# Patient Record
Sex: FEMALE | Race: WHITE | NOT HISPANIC OR LATINO | Employment: OTHER | URBAN - METROPOLITAN AREA
[De-identification: names, ages, dates, MRNs, and addresses within clinical notes are randomized per-mention and may not be internally consistent; named-entity substitution may affect disease eponyms.]

---

## 2020-08-18 ENCOUNTER — OFFICE VISIT (OUTPATIENT)
Dept: PODIATRY | Facility: CLINIC | Age: 80
End: 2020-08-18
Payer: MEDICARE

## 2020-08-18 VITALS
DIASTOLIC BLOOD PRESSURE: 85 MMHG | OXYGEN SATURATION: 98 % | HEART RATE: 65 BPM | BODY MASS INDEX: 20.09 KG/M2 | SYSTOLIC BLOOD PRESSURE: 144 MMHG | WEIGHT: 125 LBS | TEMPERATURE: 98 F | HEIGHT: 66 IN | RESPIRATION RATE: 19 BRPM

## 2020-08-18 DIAGNOSIS — L84 FOOT CALLUS: ICD-10-CM

## 2020-08-18 DIAGNOSIS — M21.961 ACQUIRED DEFORMITY JOINT FOOT, RIGHT: ICD-10-CM

## 2020-08-18 DIAGNOSIS — L60.1 ONYCHOLYSIS OF TOENAIL: Primary | ICD-10-CM

## 2020-08-18 PROCEDURE — 99204 OFFICE O/P NEW MOD 45 MIN: CPT | Mod: PBBFAC | Performed by: PODIATRIST

## 2020-08-18 PROCEDURE — 99202 PR OFFICE/OUTPT VISIT, NEW, LEVL II, 15-29 MIN: ICD-10-PCS | Mod: S$PBB,,, | Performed by: PODIATRIST

## 2020-08-18 PROCEDURE — 99999 PR PBB SHADOW E&M-NEW PATIENT-LVL IV: CPT | Mod: PBBFAC,,, | Performed by: PODIATRIST

## 2020-08-18 PROCEDURE — 99202 OFFICE O/P NEW SF 15 MIN: CPT | Mod: S$PBB,,, | Performed by: PODIATRIST

## 2020-08-18 PROCEDURE — 99999 PR PBB SHADOW E&M-NEW PATIENT-LVL IV: ICD-10-PCS | Mod: PBBFAC,,, | Performed by: PODIATRIST

## 2020-08-18 RX ORDER — SENNOSIDES 8.6 MG/1
2 TABLET ORAL DAILY
COMMUNITY

## 2020-08-18 RX ORDER — MOMETASONE FUROATE 50 UG/1
SPRAY, METERED NASAL
COMMUNITY
End: 2020-08-18 | Stop reason: SDUPTHER

## 2020-08-18 RX ORDER — PANTOPRAZOLE SODIUM 40 MG/1
TABLET, DELAYED RELEASE ORAL
COMMUNITY
End: 2020-08-18 | Stop reason: SDUPTHER

## 2020-08-18 RX ORDER — MELOXICAM 7.5 MG/1
TABLET ORAL
COMMUNITY
Start: 2020-06-25

## 2020-08-18 RX ORDER — SIMVASTATIN 20 MG/1
TABLET, FILM COATED ORAL
COMMUNITY
End: 2020-08-18 | Stop reason: SDUPTHER

## 2020-08-18 RX ORDER — LOSARTAN POTASSIUM 50 MG/1
TABLET ORAL
COMMUNITY
End: 2020-08-18 | Stop reason: SDUPTHER

## 2020-08-18 RX ORDER — ERGOCALCIFEROL 1.25 MG/1
50000 CAPSULE ORAL
COMMUNITY

## 2020-08-18 RX ORDER — GABAPENTIN 100 MG/1
CAPSULE ORAL
COMMUNITY
Start: 2020-07-15

## 2020-08-18 RX ORDER — AMOXICILLIN 500 MG
CAPSULE ORAL DAILY
COMMUNITY

## 2020-08-18 RX ORDER — CETIRIZINE HYDROCHLORIDE 5 MG/1
5 TABLET ORAL DAILY
COMMUNITY

## 2020-08-19 NOTE — PROGRESS NOTES
Subjective:       Patient ID: Jada Gonsalez is a 80 y.o. female.    Chief Complaint: Nail Problem (loose right big toe)  Patient presents with concern regarding back portion, near cuticle, nail coming on the right great toe.  This started 2-3 weeks ago, she has been monitoring it closely, no changes, no pain.  Last saw patient  4 years ago for acquired deformity foot joints/ callus right foot.  States the area is doing much better, has callus under control, almost completely resolved, no pain    Past Medical History:   Diagnosis Date    GERD (gastroesophageal reflux disease)     HLD (hyperlipidemia)     HTN (hypertension)     TB (pulmonary tuberculosis)     Treated w/ 3 drugs x 6 months in 1969     History reviewed. No pertinent surgical history.  History reviewed. No pertinent family history.  Social History     Socioeconomic History    Marital status:      Spouse name: Not on file    Number of children: Not on file    Years of education: Not on file    Highest education level: Not on file   Occupational History    Not on file   Social Needs    Financial resource strain: Not on file    Food insecurity     Worry: Not on file     Inability: Not on file    Transportation needs     Medical: Not on file     Non-medical: Not on file   Tobacco Use    Smoking status: Never Smoker   Substance and Sexual Activity    Alcohol use: No     Alcohol/week: 0.0 standard drinks    Drug use: No    Sexual activity: Not on file   Lifestyle    Physical activity     Days per week: Not on file     Minutes per session: Not on file    Stress: Not on file   Relationships    Social connections     Talks on phone: Not on file     Gets together: Not on file     Attends Islam service: Not on file     Active member of club or organization: Not on file     Attends meetings of clubs or organizations: Not on file     Relationship status: Not on file   Other Topics Concern    Not on file   Social History Narrative  "   Not on file       Current Outpatient Medications   Medication Sig Dispense Refill    cetirizine (ZYRTEC) 5 MG tablet Take 5 mg by mouth once daily.      ergocalciferol (VITAMIN D2) 50,000 unit Cap Take 50,000 Units by mouth every 7 days.      gabapentin (NEURONTIN) 100 MG capsule TK ONE C PO BID      hydrocodone-chlorpheniramine (TUSSIONEX) 10-8 mg/5 mL suspension Take 5 mLs by mouth every 12 (twelve) hours as needed.   0    losartan (COZAAR) 50 MG tablet Take 50 mg by mouth once daily.   1    meloxicam (MOBIC) 7.5 MG tablet       NASONEX 50 mcg/actuation nasal spray 2 sprays by Nasal route once daily.       omega-3 fatty acids/fish oil (FISH OIL-OMEGA-3 FATTY ACIDS) 300-1,000 mg capsule Take by mouth once daily.      pantoprazole (PROTONIX) 40 MG tablet Take 40 mg by mouth 2 (two) times daily.       senna (SENOKOT) 8.6 mg tablet Take 2 tablets by mouth once daily.      simvastatin (ZOCOR) 20 MG tablet Take 20 mg by mouth once daily.        No current facility-administered medications for this visit.      Review of patient's allergies indicates:   Allergen Reactions    Codeine Itching    Demerol [meperidine] Nausea Only    Tramadol        Review of Systems   Constitutional: Negative for fever.   HENT: Negative for congestion.    Respiratory: Negative for cough and shortness of breath.    Cardiovascular: Negative for leg swelling.   Musculoskeletal: Negative for gait problem.   All other systems reviewed and are negative.      Objective:      Vitals:    08/18/20 1106   BP: (!) 144/85   Pulse: 65   Resp: 19   Temp: 97.5 °F (36.4 °C)   TempSrc: Oral   SpO2: 98%   Weight: 56.7 kg (125 lb)   Height: 5' 6" (1.676 m)     Physical Exam  Vitals signs and nursing note reviewed.   Constitutional:       General: She is not in acute distress.  Cardiovascular:      Pulses:           Dorsalis pedis pulses are 1+ on the right side and 1+ on the left side.        Posterior tibial pulses are 1+ on the right side and " 1+ on the left side.   Pulmonary:      Effort: Pulmonary effort is normal.   Musculoskeletal:         General: No swelling or tenderness.      Right foot: No deformity.      Left foot: No deformity.   Feet:      Right foot:      Protective Sensation: 2 sites tested. 2 sites sensed.      Left foot:      Protective Sensation: 2 sites tested. 2 sites sensed.   Skin:     Capillary Refill: Capillary refill takes 2 to 3 seconds.   Neurological:      General: No focal deficit present.     Vascular         Normal CFT bilateral   No lower extremity edema bilateral   Pedal skin temperature and color are normal bilateral     Integumentary    is onycholysis starting at the eponychium right hallux nail.  Upon removal of detached nail there is a secondary nail growing in at the base, and a small portion along lateral aspect still attached.  Exposed portion of the nail bed is clean, pink, dry, possibility of permanent damage through previous trauma or fungal involvement  Due to chronic plantar flexed metatarsal head patient continues to develop small, nontender, but it is chronic hyperkeratotic lesion plantar right foot.  Upon debridement no skin discoloration          Neurological   Gross sensation intact bilateral feet     Musculoskeletal   Muscle Strength/Testing and Tone:  Intact, normal tone bilateral   Joints, Bones, and Muscles:  Plantar flexed metatarsal head right foot        Walks well unassisted        Presents in appropriate shoes       Assessment:       1. Onycholysis of toenail - Right Foot    2. Acquired deformity joint foot, right    3. Foot callus - Right Foot        Plan:           We discussed onycholysis, extent of detachment of right hallux nail from nail bed.  Showed patient Area after debridement of loose nail, new nail coming in, some remaining attached nail on the lateral aspect and the potential for a damaged nail growing in.  We discussed soaking, soaking regimens for fungal and damage nails.  We  discussed topical medication for skin and nail.  At this time recommended athlete's foot cream 1-2 times daily to the nail bed.  Make sure it is kept clean and dry, cream thoroughly  Absorbed into the skin and dried before applying socks and shoes.  Discussed maintenance of the nail and soaking once daily in warm water and Epson salt as well as facilitating removal of remaining portion of damaged nail  We discussed signs of infection to monitor for, contact the office if any pain, redness or swelling develops  Discussed and treated plantarflexed metatarsal head right foot causing chronic callus which is very well maintained and reviewed appropriate shoes  Patient/family were in understanding and agreement with treatment plan.  I counseled the patient on their conditions, implications and medical management.  Instructed patient/family to contact the office with any changes, questions, concerns, worsening of symptoms.   Total face to face time, exam, assessment, treatment, discussion, documentation 20 minutes, more than half this time spent on consultation and coordination of care.   Follow up as needed    This note was created using M*Modal voice recognition software that occasionally misinterpreted phrases or words.